# Patient Record
Sex: FEMALE | Race: WHITE
[De-identification: names, ages, dates, MRNs, and addresses within clinical notes are randomized per-mention and may not be internally consistent; named-entity substitution may affect disease eponyms.]

---

## 2017-01-31 ENCOUNTER — HOSPITAL ENCOUNTER (OUTPATIENT)
Dept: HOSPITAL 75 - RAD | Age: 62
End: 2017-01-31
Attending: NURSE PRACTITIONER
Payer: COMMERCIAL

## 2017-01-31 DIAGNOSIS — M79.671: Primary | ICD-10-CM

## 2017-01-31 LAB
BASOPHILS # BLD AUTO: 0.1 10^3/UL (ref 0–0.1)
BASOPHILS NFR BLD AUTO: 1 % (ref 0–10)
EOSINOPHIL # BLD AUTO: 0.1 10^3/UL (ref 0–0.3)
EOSINOPHIL NFR BLD AUTO: 1 % (ref 0–10)
ERYTHROCYTE [DISTWIDTH] IN BLOOD BY AUTOMATED COUNT: 13 % (ref 10–14.5)
ERYTHROCYTE [SEDIMENTATION RATE] IN BLOOD: 33 MM/HR (ref 0–30)
LYMPHOCYTES # BLD AUTO: 2.2 X 10^3 (ref 1–4)
LYMPHOCYTES NFR BLD AUTO: 30 % (ref 12–44)
MCH RBC QN AUTO: 28 PG (ref 25–34)
MCHC RBC AUTO-ENTMCNC: 33 G/DL (ref 32–36)
MCV RBC AUTO: 85 FL (ref 80–99)
MONOCYTES # BLD AUTO: 0.8 X 10^3 (ref 0–1)
MONOCYTES NFR BLD AUTO: 11 % (ref 0–12)
NEUTROPHILS # BLD AUTO: 4.1 X 10^3 (ref 1.8–7.8)
NEUTROPHILS NFR BLD AUTO: 57 % (ref 42–75)
PLATELET # BLD: 182 10^3/UL (ref 130–400)
PMV BLD AUTO: 10.2 FL (ref 7.4–10.4)
RBC # BLD AUTO: 4.65 10^6/UL (ref 4.35–5.85)
WBC # BLD AUTO: 7.3 10^3/UL (ref 4.3–11)

## 2017-01-31 PROCEDURE — 84550 ASSAY OF BLOOD/URIC ACID: CPT

## 2017-01-31 PROCEDURE — 36415 COLL VENOUS BLD VENIPUNCTURE: CPT

## 2017-01-31 PROCEDURE — 85652 RBC SED RATE AUTOMATED: CPT

## 2017-01-31 PROCEDURE — 85025 COMPLETE CBC W/AUTO DIFF WBC: CPT

## 2017-01-31 PROCEDURE — 73630 X-RAY EXAM OF FOOT: CPT

## 2017-01-31 NOTE — DIAGNOSTIC IMAGING REPORT
Three views of the right foot.



INDICATION: Pain and swelling.



FINDINGS: There is mild osteophyte formation at the first

metatarsophalangeal joint with subchondral sclerosis. Mild

degenerative changes in the DIP and to lesser extent in the PIP

joints seen. There is no fracture or dislocation identified.

Joint alignment is satisfactory.



IMPRESSION: Degenerative changes at the first MTP and at the

interphalangeal joint seen.



Dictated by:



Dictated on workstation # [id number]

## 2017-01-31 NOTE — XMS REPORT
Continuity of Care Document

 Created on: 2015



MARY RODRIGUEZ

External Reference #: [id number]

: 1955

Sex: Female



Demographics







 Address  [address]

[place]  [address]

 

 Home Phone  [phone]

 

 Preferred Language  English

 

 Marital Status  Unknown

 

 Cheondoism Affiliation  Unknown

 

 Race  Unknown

 

 Ethnic Group  Unknown





Author







 Author  [facility]

 

 Organization  [facility]

 

 Address  Unknown

 

 Phone  Unavailable







Support







 Name  Relationship  Address  Phone

 

 DANIELLE LOZADA MD  Caregiver  [address]

[place]  [address] [phone]

 

 JORDON LOPEZ DO  Caregiver  [address]

[place]  [address] [phone]

 

 KYRIE RODRIGUEZ  Next Of Kin  [address]

[place]  [address] [phone]







Care Team Providers







 Care Team Member Name  Role  Phone

 

 JORDON LOPEZ DO  PCP  [phone]







Insurance Providers







 Payer Name  Policy Number  Subscriber Name  Relationship

 

 [employer]  [id number]  Mary Rodriguez  16 Self / Same As Patient







Advance Directives







 Directive  Response  Recorded Date/Time

 

 Advance Directives  No  06/12/15 9:50am

 

 Health Care Power of   No  06/12/15 9:50am

 

 Organ Donor  Yes  06/12/15 9:50am

 

 Resuscitation Status  Full Code  06/12/15 9:50am







Problems

No known problems or medical conditions.



Medications







 Medication  Dose  Route  Sig  Days/Qty  Instructions  Order Date  Discontinued 
Date  Status

 

 Zolpidem Tartrate                 05/23/09  03/15/11  Discontinued

 

 Aspirin  81 Mg  PO  DAILY        09  Discontinued

 

 Diphenhydramine HCl (Benadryl)  50 Mg  PO  TWICE A DAY        05/23/09  06/12/
15  Discontinued

 

 Furosemide (Lasix)  20 Mg  PO  DAILY        09     Active

 

 Lisinopril                 05/23/09  03/15/11  Discontinued

 

 Gemfibrozil  600 Mg  PO  TWICE A DAY        09  Discontinued

 

 Omeprazole  20 Mg  PO  TWICE A DAY        09  Discontinued

 

 Methotrexate Sodium  20 Mg  PO  weekly        05/23/09  06/12/15  Discontinued

 

 Cholecalciferol  2,000 Unit  PO  TWICE A DAY        03/15/11     Active

 

 [iron]  65 Mg  PO  TWICE A DAY        03/15/11  03/16/11  Discontinued

 

 Sennosides/Docusate Sodium  1 Tab  PO  TWICE A DAY        03/15/11  06/12/15  
Discontinued

 

 Multivitamins  1 Tab  PO  DAILY        03/15/11     Active

 

 Zolpidem Tartrate  10 Mg  PO  BEDTIME        03/15/11  06/12/15  Discontinued

 

 Aspirin  325 Mg  PO  DAILY        11     Active

 

 Pantoprazole Sodium  1 Tab  PO  DAILY  30 Qty     11     Active

 

 Atorvastatin Calcium  40 Mg  PO  DAILY        06/12/15     Active

 

 Lisinopril  40 Mg  PO  DAILY        06/12/15     Active

 

 Metformin HCl (Glucophage)  1 Each  PO  TWICE A DAY WITH MEALS        06/12/15
     Active

 

 Omega-3S/Dha/Epa/Fish Oil  1 Each  PO  FOUR TIMES DAILY        06/12/15     
Active

 

 Zolpidem Tartrate  5 Mg  PO  BEDTIME        06/12/15     Active

 

 Cetirizine HCl (Zyrtec)  10 Mg  PO  DAILY        06/12/15     Active

 

 Ezetimibe  10 Mg  PO  DAILY        06/12/15  06/12/15  Discontinued

 

 Magnesium Oxide  250 Mg  PO  DAILY        06/12/15     Active

 

 Amlodipine Besylate  5 Mg  GT  DAILY        06/12/15     Active

 

 Canagliflozin  300 Mg  PO  DAILY        06/12/15     Active

 

 Carvedilol  12.5 Mg  GT  TWICE A DAY        06/12/15     Active

 

 Ezetimibe  10 Mg  PO  DAILY        06/12/15     Active

 

 Adalimumab  40 Mg  SQ  QOW     PT TAKES EVERY OTHER WEEK  06/12/15     Active







Social History







 Social History Problem  Response  Recorded Date/Time

 

 Alcohol Use  Denies Use  2013 9:30am

 

 Recreational Drug Use  No  2013 9:30am

 

 Recent Foreign Travel  No  2015 9:35am

 

 Smoking Status  Never a Smoker  2015 9:30am

 

 Do you dip or chew tobacco?  No  2015 9:30am









 Query  Response  Start Date  Stop Date

 

 Smoking Status  Never a Smoker      







Hospital Discharge Instructions

No hospital discharge instructions.



Plan of Care

No plan of care.



Functional Status

No functional status results.



Allergies, Adverse Reactions, Alerts







 Allergen  Type  Severity  Reaction  Status  Last Updated

 

 Niacin  Allergy  Unknown     Active  09







Immunizations







 Name  Given  Type

 

 Date of Pneumonia Vaccine  12  Historical

 

 Tetanus Booster (TDap)  More than 5yrs  Historical







Vital Signs

Acute Vital Signs





 Vital  Response  Date/Time

 

 Temperature (Fahrenheit)  97.3 degrees F (97.6 - 99.5)   

 

 Temperature (Calculated Celsius)  36.82354 degrees C (36.4 - 37.5)   

 

 Temperature Source  Tympanic     

 

 Pulse Rate (adult)  53 bpm (60 - 90)   

 

 Respiratory Rate  20 bpm (12 - 24)   

 

 O2 Sat by Pulse Oximetry  98 % (88 - 100)   

 

 Blood Pressure  110/60 mm Hg   

 

 Pain      

 

    Pain Intensity  0     

 

 Height (Feet)  5 feet    

 

 Height (Inches)  4.00 inches    

 

 Height (Calculated Centimeters)  162.270549 cm    

 

 Weight (Pounds)  204 pounds    

 

 Weight (Calculated Grams)  19172.844 gm    

 

 Weight (Calculated Kilograms)  92.963937 kilograms    

 

 Calculated BMI  35.01     







Results

Laboratory Results







 Test Name  Result  Units  Flags  Reference  Collection Date/Time  Result Date/
Time  Comments

 

 Glucometer  105  MG/DL       2015 11:13am  2015 11:19am   







Procedures







 Procedure  Status  Date  Provider(s)

 

 Diagnostic colonoscopy  completed  06/12/15  DANIELLE LOZADA MD







Encounters







 Encounter  Location  Date/Time

 

 [facility]  [facility]  06/12/15 9:19am

 

 Registered Clinic  [facility]  06/11/15 6:15am

## 2017-11-15 ENCOUNTER — HOSPITAL ENCOUNTER (OUTPATIENT)
Dept: HOSPITAL 75 - RAD | Age: 62
End: 2017-11-15
Attending: FAMILY MEDICINE
Payer: COMMERCIAL

## 2017-11-15 DIAGNOSIS — Z12.31: Primary | ICD-10-CM

## 2017-11-15 PROCEDURE — 77067 SCR MAMMO BI INCL CAD: CPT

## 2017-11-17 NOTE — DIAGNOSTIC IMAGING REPORT
Bilateral screening mammogram 2D views with tomosynthesis.



The current study was also evaluated with a Computer Aided

Detection (CAD) system.



INDICATION: Screening. No current complaints stated on the

questionnaire.



COMPARISON: 6/28/2016.



FINDINGS:

The breasts are composed of scattered fibroglandular densities.

There are punctate calcifications with minimal heterogeneity seen

in the inferior aspect of the right breast in a segmental

distribution that demonstrate minimal increase from prior exams.

The left breast demonstrates no significant change.



IMPRESSION: Segmental distribution of punctate calcifications

that demonstrate minimal increase from prior exam is seen.

Etiology is indeterminate. Further evaluation with focal

compression magnification views and ultrasound of the right

breast is recommended to ensure no suspicious findings.



ACR BI-RADS Category 0: Incomplete. (Needs additional imaging

evaluation).

Result letter will be mailed to the patient.

Note: At least 10% of breast cancer is not imaged by mammography.



Dictated by: 



  Dictated on workstation # [id number]

## 2017-11-27 ENCOUNTER — HOSPITAL ENCOUNTER (OUTPATIENT)
Dept: HOSPITAL 75 - RAD | Age: 62
End: 2017-11-27
Attending: FAMILY MEDICINE
Payer: COMMERCIAL

## 2017-11-27 DIAGNOSIS — R92.1: Primary | ICD-10-CM

## 2017-11-27 PROCEDURE — 76641 ULTRASOUND BREAST COMPLETE: CPT

## 2017-11-27 NOTE — DIAGNOSTIC IMAGING REPORT
EXAMINATION:

Right breast ultrasound.



INDICATION: 

Right breast calcifications inferiorly.



FINDINGS:

The four-quadrants and retroareolar region of the right breast

were scanned with no underlying abnormal tissue.



IMPRESSION: 

Negative study. The calcifications demonstrate no significant

heterogeneity or particular suspicious morphology. The

distribution of the calcifications is segmental, however. Further

evaluation with a breast MRI is suggested. If this cannot be

performed, then a 6 month followup mammogram is recommended. 



ACR BI-RADS Category 3: Probably benign findings.



Dictated by: 



  Dictated on workstation # [id number]

## 2017-11-27 NOTE — DIAGNOSTIC IMAGING REPORT
EXAM: Right breast diagnostic mammogram with tomography

evaluation.



The current study was also evaluated with a Computer Aided

Detection (CAD) system.



INDICATION: Right breast calcifications.



COMPARISON 11/15/2017.



FINDINGS: The right breast demonstrates calcifications with

minimal heterogeneity in the inferior aspect of the right breast.

The morphology is not particularly suspicious however the

segmental distribution is somewhat concerning. No definite

underlying mass.



IMPRESSION: 

Indeterminate segmental calcifications in the lower aspect of the

right breast. Ultrasound evaluation pending. 



BI-RADS 0.



ACR BI-RADS Category 0: Incomplete. (Needs additional imaging

evaluation).

Result letter will be mailed to the patient.

Note: At least 10% of breast cancer is not imaged by mammography.



Dictated by: 



  Dictated on workstation # [id number]

## 2018-02-05 ENCOUNTER — HOSPITAL ENCOUNTER (OUTPATIENT)
Dept: HOSPITAL 75 - CARD | Age: 63
End: 2018-02-05
Attending: PHYSICIAN ASSISTANT
Payer: COMMERCIAL

## 2018-02-05 DIAGNOSIS — I10: ICD-10-CM

## 2018-02-05 DIAGNOSIS — E78.2: ICD-10-CM

## 2018-02-05 DIAGNOSIS — I25.10: Primary | ICD-10-CM

## 2018-02-05 DIAGNOSIS — I65.23: ICD-10-CM

## 2018-02-05 PROCEDURE — 93306 TTE W/DOPPLER COMPLETE: CPT

## 2018-02-12 ENCOUNTER — HOSPITAL ENCOUNTER (OUTPATIENT)
Dept: HOSPITAL 75 - RAD | Age: 63
End: 2018-02-12
Attending: PHYSICIAN ASSISTANT
Payer: COMMERCIAL

## 2018-02-12 VITALS — DIASTOLIC BLOOD PRESSURE: 102 MMHG | SYSTOLIC BLOOD PRESSURE: 221 MMHG

## 2018-02-12 DIAGNOSIS — I65.23: ICD-10-CM

## 2018-02-12 DIAGNOSIS — I10: ICD-10-CM

## 2018-02-12 DIAGNOSIS — E78.2: ICD-10-CM

## 2018-02-12 DIAGNOSIS — I25.10: Primary | ICD-10-CM

## 2018-02-12 PROCEDURE — 78452 HT MUSCLE IMAGE SPECT MULT: CPT

## 2018-02-12 PROCEDURE — 93017 CV STRESS TEST TRACING ONLY: CPT

## 2018-02-13 NOTE — STRESS TEST
DATE OF SERVICE:  02/12/2018



REFERRING PHYSICIAN:

Dr. Howard.



Baseline heart rate is 72.  Baseline blood pressure is 220/100.  Baseline EKG is

sinus rhythm with no ischemic changes.



SUMMARY:

The patient received 10.91 mCi of technetium-99 Myoview and the resting images

were obtained and the patient received 0.4 mg of Lexiscan followed by 31.3 mCi

of technetium-99 Myoview.  Throughout the test, there were no EKG changes.



The resting and stress images were reviewed and compared in the short axis,

horizontal long axis and vertical long axis views.  Review of the images showed

typical female pattern with no significant ischemia or infarction.  SSS is 3,

SDS 3, TID value 1.01.  On the gated images, the left ventricle appeared to be

normal size with normal contractility.  Calculated ejection fraction is 62%.



CONCLUSION:

1.  The patient tolerated Lexiscan well.

2.  Baseline hypertension persisted throughout test.

3.  Typical female pattern with no significant ischemia or infarction on SPECT

images.

4.  Normal left ventricular size with normal contractility, calculated ejection

fraction 62%.





Job ID: [id number]

DocumentID: [id number]

Dictated Date:  02/12/2018 18:29:12

Transcription Date: 02/13/2018 00:16:54

Dictated By: JONATHAN ROBERTS MD

## 2018-06-06 ENCOUNTER — HOSPITAL ENCOUNTER (OUTPATIENT)
Dept: HOSPITAL 75 - RAD | Age: 63
End: 2018-06-06
Attending: FAMILY MEDICINE
Payer: COMMERCIAL

## 2018-06-06 DIAGNOSIS — R92.1: Primary | ICD-10-CM

## 2018-06-06 NOTE — DIAGNOSTIC IMAGING REPORT
INDICATION: Followup mammogram. At this time there are no current

complaints.



EXAMINATION: Right breast digital diagnostic mammogram with CAD.



The current study was also evaluated with a Computer Aided

Detection (CAD) system.



COMPARISON: This study was compared to the prior exams of

11/15/2017, 6/28/2016 and 5/6/2014. 



FINDINGS: The previous mammogram of 11/27/2017 noted

calcifications scattered throughout the right breast. On this

exam, the calcifications are again evident and do not seem to

have changed adversely. There is also some architectural

distortion in the right breast. By history, the patient did

undergo a breast biopsy in 1988. The architectural distortion

also seems similar to the prior exam. There are scattered

fibroglandular densities in the right breast which could obscure

a lesion. When compared to the previous study, however, there

does not appear to have been any adverse change. There is no

primary or secondary sign of malignancy noted. 



IMPRESSION:

1. The calcifications in the right breast, seen previously,

appear stable. Most likely they are benign.

2. I would recommend that patient have a diagnostic mammogram of

the right breast in six months for continued evaluation. She

could also have her annual screening mammogram of the left breast

at the same time.  



ACR BI-RADS Category 3: Probably benign findings.

Result letter will be mailed to the patient.

Note: At least 10% of breast cancer is not imaged by mammography.



Dictated by: 



  Dictated on workstation # [id number]

## 2018-09-27 ENCOUNTER — HOSPITAL ENCOUNTER (OUTPATIENT)
Dept: HOSPITAL 75 - REHAB | Age: 63
LOS: 3 days | Discharge: HOME | End: 2018-09-30
Attending: FAMILY MEDICINE
Payer: COMMERCIAL

## 2018-09-27 DIAGNOSIS — M47.812: Primary | ICD-10-CM

## 2018-09-27 DIAGNOSIS — M25.511: ICD-10-CM

## 2018-10-13 ENCOUNTER — HOSPITAL ENCOUNTER (OUTPATIENT)
Dept: HOSPITAL 75 - RAD | Age: 63
End: 2018-10-13
Attending: FAMILY MEDICINE
Payer: COMMERCIAL

## 2018-10-13 DIAGNOSIS — M47.22: ICD-10-CM

## 2018-10-13 DIAGNOSIS — M25.78: ICD-10-CM

## 2018-10-13 DIAGNOSIS — M48.02: Primary | ICD-10-CM

## 2018-10-13 DIAGNOSIS — M99.71: ICD-10-CM

## 2018-10-13 DIAGNOSIS — M50.13: ICD-10-CM

## 2018-10-13 PROCEDURE — 72141 MRI NECK SPINE W/O DYE: CPT

## 2018-10-13 NOTE — DIAGNOSTIC IMAGING REPORT
PROCEDURE: MR imaging cervical spine without contrast.



TECHNIQUE: Multiplanar, multisequence MR imaging of the cervical

spine was performed without contrast.



INDICATION: Neck pain, radiculopathy    



COMPARISON: None.



FINDINGS:

Alignment of the cervical column is normal. There is no

subluxation or fracture. The marrow signal is normal throughout.

The course and caliber of the cervical cord is grossly normal.

There is no mass. 



C2-C3, C3-C4:



There is disc space narrowing with facet joint hypertrophy.

However, no foraminal or central canal stenosis is seen.



C4-C5:



Disc space narrowing with facet joint hypertrophy is seen. There

is moderate left foraminal stenosis, mild right foraminal

stenosis. No central canal stenosis is seen.



C5-C6:



Moderate osteophyte disc complex with facet joint hypertrophy is

seen. There is severe left foraminal, moderate right foraminal

stenosis. There is moderate central canal stenosis.



C6-C7, C7-T1:



There is some disc space narrowing with facet joint hypertrophy.

However, no foraminal or central canal stenosis is seen. 



 IMPRESSION:

1. Multilevel degenerative changes most pronounced at C5-C6 as

described above.

2. Normal cord signal.



Dictated by: 



  Dictated on workstation # [id number]

## 2018-11-29 ENCOUNTER — HOSPITAL ENCOUNTER (OUTPATIENT)
Dept: HOSPITAL 75 - REHAB | Age: 63
Discharge: HOME | End: 2018-11-29
Attending: FAMILY MEDICINE
Payer: COMMERCIAL

## 2018-11-29 DIAGNOSIS — M47.812: Primary | ICD-10-CM

## 2018-11-29 DIAGNOSIS — M25.511: ICD-10-CM

## 2018-12-06 ENCOUNTER — HOSPITAL ENCOUNTER (OUTPATIENT)
Dept: HOSPITAL 75 - RAD | Age: 63
End: 2018-12-06
Attending: FAMILY MEDICINE
Payer: COMMERCIAL

## 2018-12-06 DIAGNOSIS — R92.1: Primary | ICD-10-CM

## 2018-12-06 PROCEDURE — 77066 DX MAMMO INCL CAD BI: CPT

## 2018-12-06 NOTE — DIAGNOSTIC IMAGING REPORT
INDICATION: Right breast calcifications. Patient presents for

follow-up.



Comparison is made with prior mammograms from 06/06/2018,

11/27/2017, and 11/15/2017.



2-D and 3-D bilateral diagnostic mammography was performed with

computer-aided detection (CAD) system.



FINDINGS: Scattered fibronodular densities are identified

bilaterally. Calcifications in the right breast appear stable and

appear benign. No new mass or malignant-appearing

microcalcifications are seen. The axillae are unremarkable.



IMPRESSION: Stable bilateral mammograms. No mammographic features

suspicious for malignancy are identified. Patient may return to

routine annual screening mammography.



ACR BI-RADS Category 2: Benign findings.

Result letter will be mailed to the patient.

Note: At least 10% of breast cancer is not imaged by mammography.



Dictated by: 



  Dictated on workstation # [id number]

## 2019-02-26 ENCOUNTER — HOSPITAL ENCOUNTER (OUTPATIENT)
Dept: HOSPITAL 75 - REHAB | Age: 64
Discharge: HOME | End: 2019-02-26
Attending: PAIN MEDICINE
Payer: COMMERCIAL

## 2019-02-26 DIAGNOSIS — M46.1: Primary | ICD-10-CM

## 2019-04-17 ENCOUNTER — HOSPITAL ENCOUNTER (OUTPATIENT)
Dept: HOSPITAL 75 - LABNPT | Age: 64
End: 2019-04-17
Attending: NURSE PRACTITIONER
Payer: COMMERCIAL

## 2019-04-17 DIAGNOSIS — I10: ICD-10-CM

## 2019-04-17 DIAGNOSIS — Z95.1: ICD-10-CM

## 2019-04-17 DIAGNOSIS — R00.2: Primary | ICD-10-CM

## 2019-04-17 LAB — CK MB SERPL-MCNC: 1.5 NG/ML (ref ?–6.6)

## 2019-04-17 PROCEDURE — 84484 ASSAY OF TROPONIN QUANT: CPT

## 2019-04-17 PROCEDURE — 82553 CREATINE MB FRACTION: CPT

## 2019-04-17 PROCEDURE — 83874 ASSAY OF MYOGLOBIN: CPT

## 2019-08-20 ENCOUNTER — HOSPITAL ENCOUNTER (OUTPATIENT)
Dept: HOSPITAL 75 - RAD | Age: 64
End: 2019-08-20
Attending: NURSE PRACTITIONER
Payer: COMMERCIAL

## 2019-08-20 DIAGNOSIS — Z98.890: ICD-10-CM

## 2019-08-20 DIAGNOSIS — R04.2: Primary | ICD-10-CM

## 2019-08-20 PROCEDURE — 71046 X-RAY EXAM CHEST 2 VIEWS: CPT

## 2019-08-20 NOTE — DIAGNOSTIC IMAGING REPORT
INDICATION: Hemoptysis



PA and lateral chest obtained at 932 hours am.



 Comparison is made to 03/15/2011



Patient has had previous sternotomy. The heart is top limits of

normal in size. Mediastinal silhouette is otherwise unremarkable.

The right lung appears clear. There are some linear scarring in

the left lung base which is new compared to 03/15/2011. There is

no pneumothorax or pleural fluid.



IMPRESSION:



Poststernotomy changes with borderline heart size. There appears

to be some linear scarring in the left lateral base. If

hemoptysis continues, consider a CT study.



Dictated by: 



  Dictated on workstation # [id number]

## 2019-08-26 ENCOUNTER — HOSPITAL ENCOUNTER (OUTPATIENT)
Dept: HOSPITAL 75 - RAD | Age: 64
End: 2019-08-26
Attending: FAMILY MEDICINE
Payer: COMMERCIAL

## 2019-08-26 DIAGNOSIS — I25.810: Primary | ICD-10-CM

## 2019-08-26 DIAGNOSIS — R04.2: ICD-10-CM

## 2019-08-26 DIAGNOSIS — F17.210: ICD-10-CM

## 2019-08-26 PROCEDURE — 71260 CT THORAX DX C+: CPT

## 2019-08-26 NOTE — DIAGNOSTIC IMAGING REPORT
PROCEDURE: CT chest with contrast only.



TECHNIQUE: Multiple contiguous axial images were obtained through

the chest after administration of intravenous contrast. Auto

Exposure Controls were utilized during the CT exam to meet ALARA

standards for radiation dose reduction. 



INDICATION:  Tobacco use and hemoptysis.



COMPARISON: No prior examinations are available for comparison.



FINDINGS: The heart size is normal. There are coronary artery

calcifications. There is some focal scarring or atelectasis in

left lung base. There is a loculated collection of air within the

pleural space posterior laterally. There is no discrete

pneumothorax. There is no pathologically enlarged adenopathy in

the chest. The visualized intra-abdominal structures are

unremarkable. There is a small hiatal hernia. There are mild

degenerative changes in the spine. There has been a previous

median sternotomy and coronary bypass graft.



IMPRESSION: 

1. Focal scarring or atelectasis in the left lung base with a

loculated collection of air along the posterior lateral aspect of

the lung base. These findings are nonspecific however may reflect

chronic sequela of chest tube placement. Recommend clinical

correlation.



2. Coronary artery calcification with previous coronary artery

bypass graft.



3. No other acute abnormality in the chest.



Dictated by: 



  Dictated on workstation # [id number]

## 2019-09-16 ENCOUNTER — HOSPITAL ENCOUNTER (OUTPATIENT)
Dept: HOSPITAL 75 - CARD | Age: 64
End: 2019-09-16
Attending: PHYSICIAN ASSISTANT
Payer: COMMERCIAL

## 2019-09-16 VITALS — SYSTOLIC BLOOD PRESSURE: 176 MMHG | DIASTOLIC BLOOD PRESSURE: 96 MMHG

## 2019-09-16 VITALS — DIASTOLIC BLOOD PRESSURE: 110 MMHG | SYSTOLIC BLOOD PRESSURE: 218 MMHG

## 2019-09-16 VITALS — WEIGHT: 187.39 LBS | HEIGHT: 63.78 IN | BODY MASS INDEX: 32.39 KG/M2

## 2019-09-16 DIAGNOSIS — E78.2: ICD-10-CM

## 2019-09-16 DIAGNOSIS — I65.29: ICD-10-CM

## 2019-09-16 DIAGNOSIS — E11.9: ICD-10-CM

## 2019-09-16 DIAGNOSIS — I25.10: Primary | ICD-10-CM

## 2019-09-16 DIAGNOSIS — I10: ICD-10-CM

## 2019-09-16 PROCEDURE — 78452 HT MUSCLE IMAGE SPECT MULT: CPT

## 2019-09-16 PROCEDURE — 93017 CV STRESS TEST TRACING ONLY: CPT

## 2019-09-17 NOTE — STRESS TEST
DATE OF SERVICE:  09/16/2019



LEXISCAN MYOVIEW STRESS TEST REPORT



REFERRING PHYSICIAN

Marlene Howard DO



Baseline heart rate is 72.  Baseline blood pressure 218/110.  Baseline EKG is

sinus rhythm with no ischemic changes.



In summary, the patient was injected with 10.43 mCi of technetium-99 Myoview and

the resting images were obtained.  Then, she received 0.4 mg of Lexiscan

followed by 30.2 mCi of technetium-99 Myoview.  Throughout the test, there were

no EKG changes.



The resting and stress images were reviewed and compared in the short axis,

horizontal long axis, and vertical long axis views.  Review of the images showed

breast attenuation with mild decreased uptake at the apex with subtle

reversibility.  SSS is 3, SDS 3, TID value 1.05.  On the gated images, the left

ventricle appeared to be normal size with normal contractility.  Calculated

ejection fraction 63%.  No significant ischemia was noted.



CONCLUSION:

1.  The patient tolerated Lexiscan well.

2.  Baseline hypertension with blood pressure 218/110, persisted during test.

3.  Breast attenuation with typical female pattern with no significant ischemia

or infarction on SPECT images.

4.  Normal left ventricular size with normal contractility.  Calculated ejection

fraction is 63%.





Job ID: [id number]

DocumentID: [id number]

Dictated Date:  09/17/2019 07:41:09

Transcription Date: 09/17/2019 09:08:08

Dictated By: JONATHAN ROBERTS MD

## 2019-12-18 ENCOUNTER — HOSPITAL ENCOUNTER (OUTPATIENT)
Dept: HOSPITAL 75 - RAD | Age: 64
End: 2019-12-18
Attending: FAMILY MEDICINE
Payer: COMMERCIAL

## 2019-12-18 DIAGNOSIS — Z12.31: Primary | ICD-10-CM

## 2019-12-18 PROCEDURE — 77067 SCR MAMMO BI INCL CAD: CPT

## 2019-12-18 NOTE — DIAGNOSTIC IMAGING REPORT
INDICATION: 

Routine screening.



COMPARISON:       

12/06/2018 and 11/15/2017.



TECHNIQUE: 

2D and 3D bilateral screening mammography was performed with CAD.



FINDINGS:

Both breasts remain heterogeneously dense, limiting the

sensitivity of mammography. The previously noted benign

calcifications in the right breast appear stable. No new mass or

malignant appearing microcalcifications are seen. The axillae are

unremarkable.



IMPRESSION: 

No mammographic features suspicious for malignancy are

identified.



ACR BI-RADS Category 2: Benign findings.

Result letter will be mailed to the patient.

Note: At least 10% of breast cancer is not imaged by mammography.



Dictated by: 



  Dictated on workstation # [id number]

## 2020-03-13 ENCOUNTER — HOSPITAL ENCOUNTER (OUTPATIENT)
Dept: HOSPITAL 75 - RAD | Age: 65
End: 2020-03-13
Attending: NURSE PRACTITIONER
Payer: COMMERCIAL

## 2020-03-13 DIAGNOSIS — M48.02: ICD-10-CM

## 2020-03-13 DIAGNOSIS — M43.12: ICD-10-CM

## 2020-03-13 DIAGNOSIS — M54.12: Primary | ICD-10-CM

## 2020-03-13 DIAGNOSIS — M47.22: ICD-10-CM

## 2020-03-13 PROCEDURE — 72141 MRI NECK SPINE W/O DYE: CPT

## 2020-03-13 NOTE — DIAGNOSTIC IMAGING REPORT
PROCEDURE: MR imaging cervical spine without contrast.



TECHNIQUE: Multiplanar, multisequence MR imaging of the cervical

spine was performed without contrast.



DATE: March 13, 2020.



COMPARISON: MRI cervical spine October 13, 2018.



INDICATION: 64-year-old female, cervical radiculopathy. Pain

extending to the right upper extremity.



FINDINGS: There is mild grade 1 retrolisthesis of C5 on C6. There

is no evidence of a diffuse marrow infiltrating or replacing

process. There is no identified focal concerning bone lesion.

There is arthritis at the C1-C2 articulation. The visualized

spinal cord is unremarkable. There is severe disc height loss at

C5-C6. Additional disc heights are well preserved.



C2-C3: There is no disc bulge. The uncovertebral and facet joints

are unremarkable. There is no foraminal narrowing. There is no

spinal canal stenosis.



C3-C4: There is no disc bulge. The uncovertebral and facet joints

are unremarkable. There is no foraminal narrowing. There is no

spinal canal stenosis.



C4-C5: There is no disc bulge. The uncovertebral and facet joints

are unremarkable. There is no foraminal narrowing. There is no

spinal canal stenosis.



C5-C6: There is a small posterior disc osteophyte complex. There

are left greater than right uncovertebral degenerative changes.

There is mild/moderate right and moderate to severe left

foraminal narrowing. There is no spinal canal stenosis.



C6-C7: There is no disc bulge. The uncovertebral and facet joints

are unremarkable. There is no foraminal narrowing. There is no

spinal canal stenosis.



C7-T1: There is no disc bulge. The uncovertebral and facet joints

are unremarkable. There is no foraminal narrowing. There is no

spinal canal stenosis.



IMPRESSION: 

1. C5-C6 posterior disc osteophyte complex with left greater than

right uncovertebral degenerative changes. There is moderate to

severe left and mild-to-moderate right foraminal narrowing at

this level. Appearance is similar to October 13, 2018 MRI of

cervical spine.

2. Mild grade 1 retrolisthesis of C5 on C6.

3. C1-C2 arthritis.  



Dictated by: 



  Dictated on workstation # [id number]

## 2020-07-02 ENCOUNTER — HOSPITAL ENCOUNTER (OUTPATIENT)
Dept: HOSPITAL 75 - REHAB | Age: 65
Discharge: HOME | End: 2020-07-02
Attending: NURSE PRACTITIONER
Payer: COMMERCIAL

## 2020-07-02 DIAGNOSIS — M25.811: Primary | ICD-10-CM

## 2020-12-23 ENCOUNTER — HOSPITAL ENCOUNTER (OUTPATIENT)
Dept: HOSPITAL 75 - RAD | Age: 65
End: 2020-12-23
Attending: FAMILY MEDICINE
Payer: COMMERCIAL

## 2020-12-23 DIAGNOSIS — Z12.31: Primary | ICD-10-CM

## 2020-12-23 PROCEDURE — 77063 BREAST TOMOSYNTHESIS BI: CPT

## 2020-12-23 PROCEDURE — 77067 SCR MAMMO BI INCL CAD: CPT

## 2020-12-23 NOTE — DIAGNOSTIC IMAGING REPORT
INDICATION: 

Routine screening.



COMPARISON:     

12/18/2019 and 12/06/2018.



TECHNIQUE: 

2D and 3D bilateral screening mammography was performed with CAD.



FINDINGS:

Both breasts remain heterogeneously dense, limiting the

sensitivity of mammography. Benign calcifications in the right

breast appear stable. No new mass or malignant appearing

microcalcifications are seen. The axillae are unremarkable.



IMPRESSION: 

No mammographic features suspicious for malignancy are

identified.



ACR BI-RADS Category 2: Benign findings.

Result letter will be mailed to the patient.

Note: At least 10% of breast cancer is not imaged by mammography.



Dictated by: 



  Dictated on workstation # [id number]

## 2021-02-17 ENCOUNTER — HOSPITAL ENCOUNTER (OUTPATIENT)
Dept: HOSPITAL 75 - PREOP | Age: 66
Discharge: HOME | End: 2021-02-17
Attending: SPECIALIST
Payer: MEDICARE

## 2021-02-17 VITALS — HEIGHT: 64.02 IN | WEIGHT: 187.39 LBS | BODY MASS INDEX: 31.99 KG/M2

## 2021-02-17 DIAGNOSIS — Z01.812: Primary | ICD-10-CM

## 2021-02-17 DIAGNOSIS — Z20.822: ICD-10-CM

## 2021-02-17 PROCEDURE — 87635 SARS-COV-2 COVID-19 AMP PRB: CPT

## 2021-02-19 ENCOUNTER — HOSPITAL ENCOUNTER (OUTPATIENT)
Dept: HOSPITAL 75 - SDC | Age: 66
Discharge: HOME | End: 2021-02-19
Attending: SPECIALIST
Payer: MEDICARE

## 2021-02-19 VITALS — DIASTOLIC BLOOD PRESSURE: 78 MMHG | SYSTOLIC BLOOD PRESSURE: 158 MMHG

## 2021-02-19 VITALS — WEIGHT: 187.39 LBS | BODY MASS INDEX: 31.99 KG/M2 | HEIGHT: 64.02 IN

## 2021-02-19 VITALS — SYSTOLIC BLOOD PRESSURE: 184 MMHG | DIASTOLIC BLOOD PRESSURE: 94 MMHG

## 2021-02-19 DIAGNOSIS — I25.10: ICD-10-CM

## 2021-02-19 DIAGNOSIS — M19.90: ICD-10-CM

## 2021-02-19 DIAGNOSIS — Z95.1: ICD-10-CM

## 2021-02-19 DIAGNOSIS — E78.00: ICD-10-CM

## 2021-02-19 DIAGNOSIS — I10: ICD-10-CM

## 2021-02-19 DIAGNOSIS — Z80.7: ICD-10-CM

## 2021-02-19 DIAGNOSIS — Z88.8: ICD-10-CM

## 2021-02-19 DIAGNOSIS — Z79.899: ICD-10-CM

## 2021-02-19 DIAGNOSIS — Z87.891: ICD-10-CM

## 2021-02-19 DIAGNOSIS — H25.12: ICD-10-CM

## 2021-02-19 DIAGNOSIS — E11.36: Primary | ICD-10-CM

## 2021-02-19 PROCEDURE — 66984 XCAPSL CTRC RMVL W/O ECP: CPT

## 2021-02-19 PROCEDURE — 82962 GLUCOSE BLOOD TEST: CPT

## 2021-02-19 RX ADMIN — TETRACAINE HYDROCHLORIDE PRN ML: 5 SOLUTION OPHTHALMIC at 09:50

## 2021-02-19 RX ADMIN — PHENYLEPHRINE HYDROCHLORIDE SCH ML: 100 SOLUTION/ DROPS OPHTHALMIC at 09:50

## 2021-02-19 RX ADMIN — TETRACAINE HYDROCHLORIDE PRN ML: 5 SOLUTION OPHTHALMIC at 09:32

## 2021-02-19 RX ADMIN — PHENYLEPHRINE HYDROCHLORIDE SCH ML: 100 SOLUTION/ DROPS OPHTHALMIC at 09:40

## 2021-02-19 RX ADMIN — TROPICAMIDE SCH ML: 10 SOLUTION/ DROPS OPHTHALMIC at 09:40

## 2021-02-19 RX ADMIN — TROPICAMIDE SCH ML: 10 SOLUTION/ DROPS OPHTHALMIC at 09:45

## 2021-02-19 RX ADMIN — TETRACAINE HYDROCHLORIDE PRN ML: 5 SOLUTION OPHTHALMIC at 09:45

## 2021-02-19 RX ADMIN — TETRACAINE HYDROCHLORIDE PRN ML: 5 SOLUTION OPHTHALMIC at 09:40

## 2021-02-19 RX ADMIN — PHENYLEPHRINE HYDROCHLORIDE SCH ML: 100 SOLUTION/ DROPS OPHTHALMIC at 09:45

## 2021-02-19 RX ADMIN — TROPICAMIDE SCH ML: 10 SOLUTION/ DROPS OPHTHALMIC at 09:50

## 2021-02-19 NOTE — ANESTHESIA-GENERAL POST-OP
MAC


Patient Condition


Mental Status/LOC:  Same as Preop


Cardiovascular:  Satisfactory


Nausea/Vomiting:  Absent


Respiratory:  Satisfactory


Pain:  Controlled


Complications:  Absent





Post Op Complications


Complications


None





Follow Up Care/Instructions


Patient Instructions


None needed.





Anesthesiology Discharge Order


Discharge Order


Patient was seen this morning after the procedure and she was doing well, no 

complaints, stable vital signs, no apparent adverse anesthesia problems.











LAMONT BRANDT DO         Feb 19, 2021 15:28

## 2021-02-19 NOTE — OPHTHALMOLOGY OPERATIVE REPORT
Cataract removal/placement IOL


PREOPERATIVE DIAGNOSIS:    Cataract Left Eye


POSTOPERATIVE DIAGNOSIS: Cataract Left Eye





PROCEDURE: Cataract removal and placement of posterior chamber implant, left eye





SURGEON: Lenny Cruz 





ANESTHESIA: Topical with sedation





COMPLICATIONS: None





ESTIMATED BLOOD LOSS: Minimal 





DESCRIPTION OF PROCEDURE:


After proper informed consent was obtained, the patient, a 65 female, was taken 

to the Operating Room and the left eye was anesthetized with tetracaine.  The 

left eye was then prepped and draped in the usual manner.  A wire lid speculum 

was placed. A paracentesis was made at the left hand position. Preservative free

lidocaine was injected into the anterior chamber followed by viscoelastic.  A 

clear corneal incision was made in the temporal position. A capsulorrhexis was 

preformed and the central nuclear and cortical material were removed.  The 

posterior capsule was polished and an Adriano [id number] was placed into the 

capsular bag. The residual viscoelastic was aspirated and balanced saline 

solution was injected into the anterior chamber.  Moxifloxacin was injected into

the anterior chamber.





The wound was checked and found to be water tight.





The patient tolerated the procedure well without complications.











LENNY CRUZ MD             Feb 19, 2021 10:26

## 2021-02-19 NOTE — OPHTHALMOLOGIST PRE-OP NOTE
Pre-Operative Progress Note


H&P Reviewed


The H&P was reviewed, patient examined and no changes noted.


Date H&P Reviewed:  Feb 19, 2021


Time H&P Reviewed:  09:54


Pre-Op Dx


Cataract, Left Eye











OUMAR CRUZ MD             Feb 19, 2021 09:54

## 2021-02-24 ENCOUNTER — HOSPITAL ENCOUNTER (OUTPATIENT)
Dept: HOSPITAL 75 - PREOP | Age: 66
Discharge: HOME | End: 2021-02-24
Attending: SPECIALIST
Payer: MEDICARE

## 2021-02-24 DIAGNOSIS — H25.11: ICD-10-CM

## 2021-02-24 DIAGNOSIS — Z01.812: Primary | ICD-10-CM

## 2021-02-24 DIAGNOSIS — Z20.822: ICD-10-CM

## 2021-02-24 PROCEDURE — 87635 SARS-COV-2 COVID-19 AMP PRB: CPT

## 2021-02-26 ENCOUNTER — HOSPITAL ENCOUNTER (OUTPATIENT)
Dept: HOSPITAL 75 - SDC | Age: 66
Discharge: HOME | End: 2021-02-26
Attending: SPECIALIST
Payer: MEDICARE

## 2021-02-26 VITALS — DIASTOLIC BLOOD PRESSURE: 87 MMHG | SYSTOLIC BLOOD PRESSURE: 175 MMHG

## 2021-02-26 VITALS — DIASTOLIC BLOOD PRESSURE: 78 MMHG | SYSTOLIC BLOOD PRESSURE: 155 MMHG

## 2021-02-26 VITALS — BODY MASS INDEX: 34.21 KG/M2 | WEIGHT: 200.4 LBS | HEIGHT: 64.02 IN

## 2021-02-26 DIAGNOSIS — H25.811: ICD-10-CM

## 2021-02-26 DIAGNOSIS — Z90.710: ICD-10-CM

## 2021-02-26 DIAGNOSIS — I10: ICD-10-CM

## 2021-02-26 DIAGNOSIS — E11.36: Primary | ICD-10-CM

## 2021-02-26 DIAGNOSIS — Z91.048: ICD-10-CM

## 2021-02-26 DIAGNOSIS — Z80.7: ICD-10-CM

## 2021-02-26 DIAGNOSIS — E78.00: ICD-10-CM

## 2021-02-26 DIAGNOSIS — Z87.891: ICD-10-CM

## 2021-02-26 DIAGNOSIS — Z79.899: ICD-10-CM

## 2021-02-26 DIAGNOSIS — M19.90: ICD-10-CM

## 2021-02-26 DIAGNOSIS — I25.10: ICD-10-CM

## 2021-02-26 PROCEDURE — 82962 GLUCOSE BLOOD TEST: CPT

## 2021-02-26 PROCEDURE — 66984 XCAPSL CTRC RMVL W/O ECP: CPT

## 2021-02-26 RX ADMIN — TETRACAINE HYDROCHLORIDE PRN ML: 5 SOLUTION OPHTHALMIC at 09:20

## 2021-02-26 RX ADMIN — PHENYLEPHRINE HYDROCHLORIDE SCH ML: 100 SOLUTION/ DROPS OPHTHALMIC at 09:33

## 2021-02-26 RX ADMIN — TROPICAMIDE SCH ML: 10 SOLUTION/ DROPS OPHTHALMIC at 09:34

## 2021-02-26 RX ADMIN — TROPICAMIDE SCH ML: 10 SOLUTION/ DROPS OPHTHALMIC at 09:40

## 2021-02-26 RX ADMIN — PHENYLEPHRINE HYDROCHLORIDE SCH ML: 100 SOLUTION/ DROPS OPHTHALMIC at 09:39

## 2021-02-26 RX ADMIN — TETRACAINE HYDROCHLORIDE PRN ML: 5 SOLUTION OPHTHALMIC at 09:39

## 2021-02-26 RX ADMIN — TROPICAMIDE SCH ML: 10 SOLUTION/ DROPS OPHTHALMIC at 09:45

## 2021-02-26 RX ADMIN — PHENYLEPHRINE HYDROCHLORIDE SCH ML: 100 SOLUTION/ DROPS OPHTHALMIC at 09:45

## 2021-02-26 RX ADMIN — TETRACAINE HYDROCHLORIDE PRN ML: 5 SOLUTION OPHTHALMIC at 09:33

## 2021-02-26 RX ADMIN — TETRACAINE HYDROCHLORIDE PRN ML: 5 SOLUTION OPHTHALMIC at 09:45

## 2021-02-26 NOTE — ANESTHESIA-GENERAL POST-OP
MAC


Patient Condition


Mental Status/LOC:  Same as Preop


Cardiovascular:  Satisfactory


Nausea/Vomiting:  Absent


Respiratory:  Satisfactory


Pain:  Controlled


Complications:  Absent





Post Op Complications


Complications


None





Follow Up Care/Instructions


Patient Instructions


None needed.





Anesthesiology Discharge Order


Discharge Order


Patient was seen this morning after the procedure and she was doing well, no 

complaints, stable vital signs, no apparent adverse anesthesia problems.











LAMONT BRANDT DO         Feb 26, 2021 13:09

## 2021-02-26 NOTE — OPHTHALMOLOGIST PRE-OP NOTE
Pre-Operative Progress Note


H&P Reviewed


The H&P was reviewed, patient examined and no changes noted.


Date H&P Reviewed:  Feb 26, 2021


Time H&P Reviewed:  09:52


Pre-Op Dx


Cataract, Right Eye











OUMAR CRUZ MD             Feb 26, 2021 09:52

## 2021-03-15 NOTE — OPERATIVE REPORT
DATE OF SERVICE:  02/26/2021



PREOPERATIVE DIAGNOSIS:

Combined cataract, right eye.



POSTOPERATIVE DIAGNOSIS:

Combined cataract, right eye.



PROCEDURE PERFORMED:

Phacoemulsification with posterior chamber intraocular lens.



ANESTHESIA:

Topical with IV sedation.



COMPLICATIONS:

None.



DESCRIPTION OF PROCEDURE:

An informed consent was obtained from the patient and placed on the chart.  The

eye was dilated and anesthetized and she was taken to the operating room.  She

was then prepped and draped in the usual sterile fashion.  A wire lid speculum

was placed.  A paracentesis was made at the left hand position. 

Preservative-free lidocaine was injected into the anterior chamber followed by

viscoelastic.  A clear corneal incision was then made in the temporal position. 

A capsulorrhexis was performed and the central nuclear and cortical material

were removed.  The posterior capsule was polished.  The anterior chamber was

refilled with viscoelastic and an Adriano model AU00T0, power 24.0 diopter lens

was placed into the capsular bag.  The residual viscoelastic was aspirated.  The

anterior chamber was filled with balanced salt saline and moxifloxacin was

injected.  The wounds were checked and found to be watertight.  The patient

tolerated the procedure well and was taken to the recovery room in a stable

condition.





Job ID: [id number]

DocumentID: [id number]

Dictated Date:  03/15/2021 08:48:31

Transcription Date: 03/15/2021 18:17:09

Dictated By: OUMAR CRUZ MD

## 2021-08-04 ENCOUNTER — HOSPITAL ENCOUNTER (OUTPATIENT)
Dept: HOSPITAL 75 - RAD | Age: 66
End: 2021-08-04
Attending: NURSE PRACTITIONER
Payer: MEDICARE

## 2021-08-04 DIAGNOSIS — M79.661: Primary | ICD-10-CM

## 2021-08-04 NOTE — DIAGNOSTIC IMAGING REPORT
PROCEDURE: US right lower extremity venous.



TECHNIQUE: Multiple real-time grayscale images were obtained over

the right lower extremity in various projections. Additional

spectral analysis and color Doppler duplex images were also

obtained.



INDICATION: Right leg pain.



There is no evidence of right lower extremity DVT. Right large

lower extremity venous system shows normal compressibility with

normal response to augmentation and Valsalva. No fluid collection

or mass is detected.



IMPRESSION: No evidence of right lower extremity DVT.



Dictated by: 



  Dictated on workstation # [id number]

## 2021-12-15 ENCOUNTER — HOSPITAL ENCOUNTER (OUTPATIENT)
Dept: HOSPITAL 75 - CARD | Age: 66
End: 2021-12-15
Attending: PHYSICIAN ASSISTANT
Payer: MEDICARE

## 2021-12-15 VITALS — DIASTOLIC BLOOD PRESSURE: 104 MMHG | SYSTOLIC BLOOD PRESSURE: 223 MMHG

## 2021-12-15 DIAGNOSIS — I25.10: Primary | ICD-10-CM

## 2021-12-15 PROCEDURE — 93017 CV STRESS TEST TRACING ONLY: CPT

## 2021-12-15 PROCEDURE — 78452 HT MUSCLE IMAGE SPECT MULT: CPT

## 2021-12-15 NOTE — CARDIOLOGY STRESS TEST REPORT
Stress Test Report


Date of Procedure/Referring:


Date of Procedure:  Dec 15, 2021


Robyn Cedeno


Admitting Physician


Marlene Hoawrd DO





Indications:


CAD





Baseline Heart Rate:


68





Baseline Blood Pressure:


Blood Pressure Systolic:  223


Blood Pressure Diastolic:  104


Baseline Vitals





Vital Signs








  Date Time  Temp Pulse Resp B/P (MAP) Pulse Ox O2 Delivery O2 Flow Rate FiO2


 


12/15/21 09:12  68  223/104 (143)    











Baseline EKG:


Baseline EKG:  NSR





Summary


After explaining the procedure to the patient, she  signed a consent and then 

brought to the stress nuclear laboratory.


Patient received 0.4 mg Lexiscan for stress test, ECG, heart rate and blood 

pressure were monitored continuously.  Resting and stress dose of radio tracer 

were injected, imaging was acquired and reviewed in short axis, horizontal long 

axis and vertical long axis views.


TID:  0.96


SSS:  1


SDS:  1


EF:  71


1.  Patient tolerated Lexiscan well


2.  Baseline hypertension persisted during test


3.  No significant ischemia or infarction on SPECT images


4.  Normal left ventricular size, EF 71%











JONATHAN ROBERTS MD              Dec 15, 2021 11:09

## 2021-12-28 ENCOUNTER — HOSPITAL ENCOUNTER (OUTPATIENT)
Dept: HOSPITAL 75 - RAD | Age: 66
End: 2021-12-28
Attending: FAMILY MEDICINE
Payer: MEDICARE

## 2021-12-28 DIAGNOSIS — Z12.31: Primary | ICD-10-CM

## 2021-12-28 PROCEDURE — 77063 BREAST TOMOSYNTHESIS BI: CPT

## 2021-12-28 PROCEDURE — 77067 SCR MAMMO BI INCL CAD: CPT

## 2021-12-28 NOTE — DIAGNOSTIC IMAGING REPORT
INDICATION: 

Routine screening.



COMPARISON:    

12/23/2020 and 12/18/2019.



TECHNIQUE: 

2D and 3D bilateral screening mammography was performed with CAD.



FINDINGS:

Both breasts are heterogeneously dense, limiting the sensitivity

of mammography. The parenchymal pattern is stable. Benign

calcifications in the right breast are stable. No new mass or

malignant-appearing microcalcifications are seen. The axillae are

unremarkable.



IMPRESSION: 

No mammographic features suspicious for malignancy are

identified.



ACR BI-RADS Category 2: Benign findings.

Result letter will be mailed to the patient.

Note: At least 10% of breast cancer is not imaged by mammography.



Dictated by: 



  Dictated on workstation # [id number]

## 2022-05-11 ENCOUNTER — HOSPITAL ENCOUNTER (OUTPATIENT)
Dept: HOSPITAL 75 - CARD | Age: 67
End: 2022-05-11
Attending: PHYSICIAN ASSISTANT
Payer: MEDICARE

## 2022-05-11 DIAGNOSIS — I25.10: ICD-10-CM

## 2022-05-11 DIAGNOSIS — I11.9: Primary | ICD-10-CM

## 2022-05-11 PROCEDURE — 93306 TTE W/DOPPLER COMPLETE: CPT

## 2022-05-31 ENCOUNTER — HOSPITAL ENCOUNTER (OUTPATIENT)
Dept: HOSPITAL 75 - RAD | Age: 67
End: 2022-05-31
Attending: FAMILY MEDICINE
Payer: MEDICARE

## 2022-05-31 DIAGNOSIS — Z78.0: ICD-10-CM

## 2022-05-31 DIAGNOSIS — Z13.820: Primary | ICD-10-CM

## 2022-05-31 PROCEDURE — 77080 DXA BONE DENSITY AXIAL: CPT

## 2022-05-31 NOTE — DIAGNOSTIC IMAGING REPORT
INDICATION: Postmenopausal screening for osteoporosis



COMPARISON: None



FINDINGS:



AP Spine L1-L4:  

[BMD (g/cm2): 1.198] [T-Score: 0.0] [Z-Score: 0.9]

[BMD Previous: na] [BMD % Change: na]



LT Hip Neck:       

[BMD (g/cm2): 0.829] [T-Score: -1.5] [Z-Score: -0.4]



LT Hip Total:       

[BMD (g/cm2):1.006] [T-Score:0.0] [Z-Score: 0.8]

[BMD Previous: na] [BMD % Change: na]



RT Hip Neck:      

[BMD (g/cm2):0.895] [T-Score:-1.0] [Z-Score:0.1]



RT Hip Total:      

[BMD (g/cm2):1.030] [T-score:0.2] [Z-Score:1.0]

[BMD Previous:na] [BMD % Change:na]



*Indicates significant change from prior examination based on 95%

confidence level.



World Health Organization criteria for BMD interpretation

classify patients as Normal (T-score at or above -1.0),

Osteopenic (T-score between -1.0 and -2.5) or Osteoporotic

(T-score at or below -2.5).



LIMITATIONS AND MODIFICATION:  None.



FRACTURE RISK (FRAX SCORE):

The ten year probability of (%): 

Major Osteoporotic Fracture: [18.7]

Hip Fracture: [2.6]



IMPRESSION:

1. Normal bone mineral density. 

2. Baseline examination.

3. See below National Osteoporosis Foundation guidelines on when

to potentially initiate pharmacologic therapy. 



Based on the National Osteoporosis Foundation Guidelines,

pharmacologic treatment should be initiated in any of the

following, unless clinical conditions suggest otherwise:



*  Any patient with prior fragility fracture of the hip or

vertebrae. A spine fracture indicates 5X risk for subsequent

spine fracture and 2X risk for subsequent hip fracture.



*  Osteoporosis (T-score <-2.5).



*  Postmenopausal women and men age 50 and older with low bone

mass/osteopenia (T-score between -1.0 and -2.5) by DXA and

10-year major osteoporotic fracture greater than 20% or a 10-year

probability of hip fracture greater than 3%. These fracture risks

are supplied above in the FRAX score, if applicable.



*  Clinician judgement and/or patient preferences may indicate

treatment for people with 10-year fracture probabilities above or

below these levels.



Dictated by: 



  Dictated on workstation # [id number]

## 2022-10-12 ENCOUNTER — HOSPITAL ENCOUNTER (OUTPATIENT)
Dept: HOSPITAL 75 - RAD | Age: 67
End: 2022-10-12
Attending: FAMILY MEDICINE
Payer: MEDICARE

## 2022-10-12 DIAGNOSIS — K76.0: Primary | ICD-10-CM

## 2022-10-12 PROCEDURE — 76705 ECHO EXAM OF ABDOMEN: CPT

## 2022-10-12 NOTE — DIAGNOSTIC IMAGING REPORT
PROCEDURE: US Gallbladder.



TECHNIQUE: Multiple real-time grayscale images were obtained over

the right upper quadrant in various projections.



INDICATION: Right upper quadrant pain.



The liver is enlarged at 19 cm. The liver does show some

increased echogenicity consistent with hepatic steatosis. No

discrete liver mass is detected. Gallbladder is without stones or

sludge. There is no wall thickening or biliary duct dilatation.

Pancreas is unremarkable. Aorta is nonaneurysmal. IVC is patent.

Right kidney is without calculi or hydronephrosis. There is no

ascites.



IMPRESSION:

1. Hepatomegaly and hepatic steatosis.

2. No evidence of cholelithiasis or acute cholecystitis.



Dictated by: 



  Dictated on workstation # [id number]

## 2022-10-24 ENCOUNTER — HOSPITAL ENCOUNTER (OUTPATIENT)
Dept: HOSPITAL 75 - CARD | Age: 67
End: 2022-10-24
Attending: FAMILY MEDICINE
Payer: MEDICARE

## 2022-10-24 DIAGNOSIS — R10.11: Primary | ICD-10-CM

## 2022-10-24 PROCEDURE — 78227 HEPATOBIL SYST IMAGE W/DRUG: CPT

## 2022-10-24 NOTE — DIAGNOSTIC IMAGING REPORT
INDICATION: Right upper quadrant pain.



TECHNIQUE: Patient was administered 5.5 mCi technetium-99m

Choletec intravenously, and imaging over the abdomen was

performed. At 45 minutes, patient ingested Ensure, and a

gallbladder ejection fraction was calculated.



FINDINGS: There is homogeneous uptake of activity by the liver

with prompt excretion of activity into the gallbladder and common

duct. There is normal passage of activity into the small bowel.

Gallbladder ejection fraction is 78%.



IMPRESSION: Normal HIDA scan and gallbladder ejection fraction.



Dictated by: 



  Dictated on workstation # [id number]

## 2022-12-29 ENCOUNTER — HOSPITAL ENCOUNTER (OUTPATIENT)
Dept: HOSPITAL 75 - RAD | Age: 67
End: 2022-12-29
Attending: FAMILY MEDICINE
Payer: MEDICARE

## 2022-12-29 DIAGNOSIS — Z12.31: Primary | ICD-10-CM

## 2022-12-29 PROCEDURE — 77067 SCR MAMMO BI INCL CAD: CPT

## 2022-12-29 PROCEDURE — 77063 BREAST TOMOSYNTHESIS BI: CPT

## 2022-12-29 NOTE — DIAGNOSTIC IMAGING REPORT
INDICATION: Routine screening.



Comparison is made with prior mammogram from 12/28/2021 and

12/23/2020.



2-D and 3-D bilateral screening mammography was performed with

CAD.



CAD is utilized.



The current study was also evaluated with a Computer Aided

Detection (CAD) system.



Both breasts are heterogeneously dense, limiting the sensitivity

of mammography. The parenchymal  pattern is stable. No mass or

malignant-appearing microcalcifications are seen. There are

benign calcifications on the right. Axillae are unremarkable.



IMPRESSION: BI-RADS Category 2



No mammographic features suspicious for malignancy are

identified.



ACR BI-RADS Category 2: Benign findings.

Result letter will be mailed to the patient.

Note: At least 10% of breast cancer is not imaged by mammography.



Dictated by: 



  Dictated on workstation # [id number]

## 2023-02-01 ENCOUNTER — HOSPITAL ENCOUNTER (OUTPATIENT)
Dept: HOSPITAL 75 - RAD | Age: 68
End: 2023-02-01
Attending: FAMILY MEDICINE
Payer: MEDICARE

## 2023-02-01 DIAGNOSIS — J34.89: Primary | ICD-10-CM

## 2023-02-01 PROCEDURE — 70486 CT MAXILLOFACIAL W/O DYE: CPT

## 2023-02-01 NOTE — DIAGNOSTIC IMAGING REPORT
INDICATION: Chronic sinusitis.



TECHNIQUE: Multiple contiguous axial images were obtained through

the sinuses without the use of intravenous contrast. Coronal and

sagittal reformations were then performed. Auto Exposure Controls

were utilized during the CT exam to meet ALARA standards for

radiation dose reduction. 



COMPARISON: There is no prior study for comparison.



FINDINGS: The frontal sinuses are clear. There is opacification

of the posterior left ethmoid air cells. Right ethmoid air cells

are clear. There is complete opacification of the left sphenoid

sinus and near-complete opacification of the right sphenoid

sinus. The maxillary sinuses are clear. There is mild deviation

of the nasal septum toward the left. Ostiomeatal complexes appear

intact. There is no bony destruction. Orbital contents appear

unremarkable.



IMPRESSION: Complete opacification of left sphenoid sinus, with

near-complete opacification of the right sphenoid sinus. There is

opacification of the posterior ethmoid air cells on the left

side. Remaining sinuses appear essentially clear.



Dictated by: 



  Dictated on workstation # [id number]

## 2023-05-25 ENCOUNTER — HOSPITAL ENCOUNTER (OUTPATIENT)
Dept: HOSPITAL 75 - RAD | Age: 68
End: 2023-05-25
Attending: OTOLARYNGOLOGY
Payer: MEDICARE

## 2023-05-25 ENCOUNTER — HOSPITAL ENCOUNTER (OUTPATIENT)
Dept: HOSPITAL 75 - RAD | Age: 68
End: 2023-05-25
Attending: FAMILY MEDICINE
Payer: MEDICARE

## 2023-05-25 DIAGNOSIS — R91.1: Primary | ICD-10-CM

## 2023-05-25 DIAGNOSIS — J32.9: Primary | ICD-10-CM

## 2023-05-25 DIAGNOSIS — R19.06: ICD-10-CM

## 2023-05-25 PROCEDURE — 70486 CT MAXILLOFACIAL W/O DYE: CPT

## 2023-05-25 PROCEDURE — 74150 CT ABDOMEN W/O CONTRAST: CPT

## 2023-05-25 NOTE — DIAGNOSTIC IMAGING REPORT
INDICATION: Epigastric pain



TECHNIQUE: Multiple contiguous axial images were obtained through

the abdomen without the use of intravenous contrast. Auto

Exposure Controls were utilized during the CT exam to meet ALARA

standards for radiation dose reduction. 



COMPARISON: There is no previous study for comparison. 



The visualized portions of the lung bases demonstrate cavitary

lesions in the lung bases, including a cavitary lesion in the

left base measuring about 2.8 cm. There are smaller lesions in

the right lower lobe. Recommend CT chest for full evaluation of

the lungs. There is no pleural fluid or free intraperitoneal air



The liver and gallbladder appear normal. The spleen, adrenals,

and pancreas appear normal. There is a small hiatal hernia. The

kidneys bilaterally appear unremarkable. There is no

retroperitoneal mass or adenopathy. There is atherosclerotic

calcification of the aorta without evidence of aneurysm.



IMPRESSION: 



No abdominal mass or abnormal fluid collection or focal

inflammatory process in the abdomen.



Visualized portion of the lung bases show multiple cavitary

lesions, differential possibilities include metastatic disease or

an infectious process. Recommend full chest CT to evaluate the

entirety of the lung fields.



Dictated by: 



  Dictated on workstation # [id number]

## 2023-05-25 NOTE — DIAGNOSTIC IMAGING REPORT
INDICATION: Chronic sinusitis.



TECHNIQUE: Multiple contiguous axial images were obtained through

the sinuses without the use of intravenous contrast. Coronal and

sagittal reformations were then performed. Auto Exposure Controls

were utilized during the CT exam to meet ALARA standards for

radiation dose reduction. 



COMPARISON made with prior sinus CT 2/01/2023. 



The frontal sinuses are clear. The ethmoid air cells show mild

opacification on the left side posteriorly. This is improved

compared to the prior study but not completely resolved. The

sphenoid sinuses show mucosal thickening and fluid on both sides

nearly filling the sinus. This shows minimal change compared to

the previous study. The maxillary sinuses are clear. Ostiomeatal

complexes appear patent. There is mild septal deviation. Orbital

contents are unremarkable.



IMPRESSION:



Compared to the previous study of 02/01/2023, the bilateral

sphenoid sinus disease appears minimally changed. There is

improved aeration of the left ethmoid air cells posteriorly

compared to the prior study. Remaining sinuses are clear.



Dictated by: 



  Dictated on workstation # [id number]

## 2023-06-06 ENCOUNTER — HOSPITAL ENCOUNTER (OUTPATIENT)
Dept: HOSPITAL 75 - RAD | Age: 68
End: 2023-06-06
Attending: FAMILY MEDICINE
Payer: MEDICARE

## 2023-06-06 DIAGNOSIS — J32.9: Primary | ICD-10-CM

## 2023-06-06 DIAGNOSIS — R91.8: ICD-10-CM

## 2023-06-06 PROCEDURE — 71250 CT THORAX DX C-: CPT

## 2023-06-06 NOTE — DIAGNOSTIC IMAGING REPORT
PROCEDURE: CT chest without contrast.



TECHNIQUE: Multiple contiguous axial images were obtained through

the chest without the use of intravenous contrast. Auto Exposure

Controls were utilized during the CT exam to meet ALARA standards

for radiation dose reduction. 



INDICATION:  Cavitary lesion of the lung bases.



COMPARISON: 05/25/2023 and 08/26/2019



FINDINGS: No pathologically enlarged lymph nodes within the

chest. Postsurgical changes of a CABG. Scattered vascular

calcifications without aneurysmal dilatation of thoracic aorta.

The heart is within normal limits in size. No significant

pericardial effusion. Trace left pleural effusion. No significant

right pleural effusion. The trachea is patent. No pneumothorax.

Multiple thick-walled cavitary lesions are again identified

within the bilateral lung bases. This includes a 4.7 cm cavitary

lesion within the left lower lobe as well as a 2.6 cm lesion

within the left lower lobe. Additional 2.1 cm lesion within the

medial aspect of the right lower lobe. These appear not

significantly changed since recent CT of the abdomen and pelvis.

Calcified granuloma within the anterior right upper lobe. Some of

this thick-walled cavitary changes were present on prior imaging

in 2019 within the left lower lobe, however, many of these

cavitary lesions appear new since that time. The minimally

visualized upper abdomen is unremarkable. Scattered osseous

degenerative changes without acute osseous abnormality.



IMPRESSION: Thick-walled cavitary lesions within the bilateral

lung bases as described above. Although some of these cavitary

changes were present in 2019, the majority of the lesions appear

new since that time, particularly within the right lung base.

Differential considerations would include multifocal pneumonia,

septic emboli, or even potentially metastatic disease. Recommend

clinical correlation. If further evaluation is desired, then a

CT-guided biopsy would be recommended. If biopsy is performed,

consideration for biopsy of the new more discrete density within

the medial right lower lobe.



Trace left pleural effusion.



Additional postsurgical and chronic findings as above.



Dictated by: 



  Dictated on workstation # [id number]

## 2023-06-21 ENCOUNTER — HOSPITAL ENCOUNTER (OUTPATIENT)
Dept: HOSPITAL 75 - SDC | Age: 68
Discharge: HOME | End: 2023-06-21
Attending: FAMILY MEDICINE
Payer: MEDICARE

## 2023-06-21 VITALS — SYSTOLIC BLOOD PRESSURE: 128 MMHG | DIASTOLIC BLOOD PRESSURE: 66 MMHG

## 2023-06-21 VITALS — SYSTOLIC BLOOD PRESSURE: 152 MMHG | DIASTOLIC BLOOD PRESSURE: 81 MMHG

## 2023-06-21 VITALS — SYSTOLIC BLOOD PRESSURE: 126 MMHG | DIASTOLIC BLOOD PRESSURE: 60 MMHG

## 2023-06-21 VITALS — DIASTOLIC BLOOD PRESSURE: 70 MMHG | SYSTOLIC BLOOD PRESSURE: 140 MMHG

## 2023-06-21 VITALS — SYSTOLIC BLOOD PRESSURE: 149 MMHG | DIASTOLIC BLOOD PRESSURE: 73 MMHG

## 2023-06-21 VITALS — SYSTOLIC BLOOD PRESSURE: 147 MMHG | DIASTOLIC BLOOD PRESSURE: 80 MMHG

## 2023-06-21 VITALS — DIASTOLIC BLOOD PRESSURE: 84 MMHG | SYSTOLIC BLOOD PRESSURE: 150 MMHG

## 2023-06-21 VITALS — SYSTOLIC BLOOD PRESSURE: 131 MMHG | DIASTOLIC BLOOD PRESSURE: 75 MMHG

## 2023-06-21 VITALS — SYSTOLIC BLOOD PRESSURE: 153 MMHG | DIASTOLIC BLOOD PRESSURE: 72 MMHG

## 2023-06-21 DIAGNOSIS — Z98.890: ICD-10-CM

## 2023-06-21 DIAGNOSIS — J98.4: Primary | ICD-10-CM

## 2023-06-21 DIAGNOSIS — R91.8: ICD-10-CM

## 2023-06-21 LAB
APTT BLD: 26 SEC (ref 24–35)
HCT VFR BLD CALC: 31 % (ref 35–52)
HGB BLD-MCNC: 10 G/DL (ref 11.5–16)
INR PPP: 1 (ref 0.8–1.4)
MCH RBC QN AUTO: 27 PG (ref 25–34)
MCHC RBC AUTO-ENTMCNC: 32 G/DL (ref 32–36)
MCV RBC AUTO: 86 FL (ref 80–99)
PLATELET # BLD: 234 10^3/UL (ref 130–400)
PMV BLD AUTO: 10.5 FL (ref 9–12.2)
PROTHROMBIN TIME: 13 SEC (ref 12.2–14.7)
WBC # BLD AUTO: 6.2 10^3/UL (ref 4.3–11)

## 2023-06-21 PROCEDURE — 88305 TISSUE EXAM BY PATHOLOGIST: CPT

## 2023-06-21 PROCEDURE — 71045 X-RAY EXAM CHEST 1 VIEW: CPT

## 2023-06-21 PROCEDURE — 36415 COLL VENOUS BLD VENIPUNCTURE: CPT

## 2023-06-21 PROCEDURE — 85027 COMPLETE CBC AUTOMATED: CPT

## 2023-06-21 PROCEDURE — 99156 MOD SED OTH PHYS/QHP 5/>YRS: CPT

## 2023-06-21 PROCEDURE — 77012 CT SCAN FOR NEEDLE BIOPSY: CPT

## 2023-06-21 PROCEDURE — 85730 THROMBOPLASTIN TIME PARTIAL: CPT

## 2023-06-21 PROCEDURE — 85610 PROTHROMBIN TIME: CPT

## 2023-06-21 NOTE — DIAGNOSTIC IMAGING REPORT
INDICATION: Right lung biopsy.



TIME OF EXAM: 11:31 AM



Expiratory radiograph of the chest demonstrates a tiny right

apical pneumothorax. No infiltrates are seen. There is no

effusion. There are changes of median sternotomy.



IMPRESSION: Tiny right apical pneumothorax, status post right

lung biopsy.



Dictated by: 



  Dictated on workstation # [id number]

## 2023-06-21 NOTE — DIAGNOSTIC IMAGING REPORT
INDICATION: 

Right lung mass. Patient presents for CT-guided lung biopsy.



TECHNIQUE: 

All CT scans use one or more of the following dose optimizing

techniques: automated exposure control, MA and/or KvP adjustment

based on patient size and exam type or iterative reconstruction. 



FINDINGS:

The patient was brought to the CT suite and placed on the table

in the prone position. Axial imaging through the chest was

performed to evaluate for an appropriate entry site. The study

was performed utilizing conscious sedation with Radiology nursing

and constant patient monitoring. The patient was given a total of

50 mg of fentanyl intravenously and 1 mg of Versed intravenously.

The total procedure time was approximately 12 minutes.



An 18-gauge coaxial Temno needle was advanced from a right

paraspinal approach and placed with its tip along the inferior

margin of the nodule in the paraspinous location in the right

lower lobe. Multiple core biopsies were obtained. A blood patch

was injected during needle removal. Followup imaging demonstrates

a very small basilar pneumothorax. Hemostasis was obtained using

manual compression. The patient tolerated the procedure well and

left the Department in stable condition.



IMPRESSION: 

CT-guided biopsy of the mass in the paraspinal location of the

right lower lobe utilizing conscious sedation. Pathology results

are currently pending.



Dictated by: 



  Dictated on workstation # [id number]

## 2023-06-21 NOTE — PRE-OP NOTE & CONSCIOUS SEDAT
Pre-Operative Progress Note


Date of Available H&P:  Jun 21, 2023


Date H&P Reviewed:  Jun 21, 2023


Time H&P Reviewed:  09:00


Pre-Op Diagnosis:  lung mass





Moderate Sedation PreProcedure


Time


09:00





ASA Score


2














Airway 


 


Lungs 


 


Heart 


 


 ASA score


 


 ASA 1: a normal healthy patient


 


 ASA 2:  a patient with a mild systemic disease (mid diabetes, controlled 

hypertension, obesity 


 


 ASA 3:  a patient with a severe systemic disease that limits activity  (angina,

COPD, prior Myocardial infarction)


 


 ASA 4:  a patient with an incapacitating disease that is a constant threat to 

life (CHF, renal failure)


 


 ASA 5:  a moribund patient not expected to survive 24 hrs.  (ruptured aneurysm)


 


 ASA 6:  a declared brain-dead patient whose organs are being harvested.


 


 For emergent operations, add the letter E after the classification











Mallampati Classification


Grade 2





Sedation Plan


Analgesia, Amnesia, Plan communicated to team members, Discussed options with 

patient/fam, Discussed risks with patient/fam


The patient is an appropriate candidate to undergo the planned procedure, 

sedation, and anesthesia.





The patient immediately re-assessed prior to indication.











KRISTINA LANDRY MD             Jun 21, 2023 15:37

## 2023-09-06 ENCOUNTER — HOSPITAL ENCOUNTER (OUTPATIENT)
Dept: HOSPITAL 75 - CARD | Age: 68
End: 2023-09-06
Attending: INTERNAL MEDICINE
Payer: MEDICARE

## 2023-09-06 DIAGNOSIS — I25.10: Primary | ICD-10-CM

## 2023-09-06 DIAGNOSIS — I11.9: ICD-10-CM

## 2023-09-06 PROCEDURE — 93306 TTE W/DOPPLER COMPLETE: CPT
